# Patient Record
Sex: MALE | Race: WHITE | URBAN - METROPOLITAN AREA
[De-identification: names, ages, dates, MRNs, and addresses within clinical notes are randomized per-mention and may not be internally consistent; named-entity substitution may affect disease eponyms.]

---

## 2017-10-05 ENCOUNTER — OFFICE VISIT (OUTPATIENT)
Dept: OPHTHALMOLOGY | Facility: CLINIC | Age: 67
End: 2017-10-05

## 2017-10-05 DIAGNOSIS — H43.393 FLOATERS IN VISUAL FIELD, BILATERAL: ICD-10-CM

## 2017-10-05 DIAGNOSIS — H25.13 NUCLEAR SCLEROTIC CATARACT, BILATERAL: Primary | ICD-10-CM

## 2017-10-05 DIAGNOSIS — H11.153 PINGUECULA OF BOTH EYES: ICD-10-CM

## 2017-10-05 PROCEDURE — 92015 DETERMINE REFRACTIVE STATE: CPT | Performed by: OPHTHALMOLOGY

## 2017-10-05 PROCEDURE — 92004 COMPRE OPH EXAM NEW PT 1/>: CPT | Performed by: OPHTHALMOLOGY

## 2017-10-05 RX ORDER — AMLODIPINE BESYLATE 5 MG/1
5 TABLET ORAL DAILY
COMMUNITY

## 2017-10-05 RX ORDER — OLMESARTAN MEDOXOMIL 40 MG/1
40 TABLET ORAL DAILY
COMMUNITY

## 2017-10-05 RX ORDER — OMEGA-3-ACID ETHYL ESTERS 1 G/1
1 CAPSULE, LIQUID FILLED ORAL DAILY
COMMUNITY

## 2017-10-05 RX ORDER — ASPIRIN 81 MG/1
81 TABLET ORAL DAILY
COMMUNITY

## 2017-10-05 RX ORDER — TRIAMTERENE AND HYDROCHLOROTHIAZIDE 37.5; 25 MG/1; MG/1
1 TABLET ORAL DAILY
COMMUNITY

## 2017-10-05 RX ORDER — DUTASTERIDE 0.5 MG/1
0.5 CAPSULE, LIQUID FILLED ORAL DAILY
COMMUNITY

## 2017-10-05 NOTE — PATIENT INSTRUCTIONS
Nuclear sclerotic cataract, bilateral  New glasses Rx given. Suggest update. Floaters in visual field, bilateral  No treatment. Pinguecula of both eyes  No treatment.

## 2017-10-05 NOTE — PROGRESS NOTES
Tomas Edge is a 74year old male. HPI:     HPI     NP. Patient is here for a complete eye exam.  Patient states that he has been seeing an optometrist and was told in 2015 that he has cataracts.   He says that his vision is not as clear in the right ey Synephrine @ 10:35 AM            Additional Tests     Amsler       Right Left     sees \"ghost image\"  Normal    OD patient sees the \"ghost image\" to the left of the grid and down./nw            Slit Lamp and Fundus Exam     Slit Lamp Exam       Right L

## 2018-11-07 ENCOUNTER — OFFICE VISIT (OUTPATIENT)
Dept: OPHTHALMOLOGY | Facility: CLINIC | Age: 68
End: 2018-11-07
Payer: MEDICARE

## 2018-11-07 DIAGNOSIS — H43.393 FLOATER, VITREOUS, BILATERAL: ICD-10-CM

## 2018-11-07 DIAGNOSIS — H25.13 AGE-RELATED NUCLEAR CATARACT OF BOTH EYES: Primary | ICD-10-CM

## 2018-11-07 PROCEDURE — 92014 COMPRE OPH EXAM EST PT 1/>: CPT | Performed by: OPHTHALMOLOGY

## 2018-11-07 PROCEDURE — 92015 DETERMINE REFRACTIVE STATE: CPT | Performed by: OPHTHALMOLOGY

## 2018-11-07 NOTE — PATIENT INSTRUCTIONS
Floater, vitreous, bilateral  No treatment. Age-related nuclear cataract of both eyes  Discussed cataracts in detail with patient. Discussed that cataracts are advanced enough to consider cataract surgery, but it would be patient's choice.      Options

## 2018-11-07 NOTE — PROGRESS NOTES
Lacey Johnson is a 77year old male. HPI:     HPI     EP/  Patient is here for complete eye exam.  Patient did not update his glasses from last year.   He complains of monocular diplopia in the right eye (he explains it like a \"ghost image \") Vision is b at distance           Dilation     Both eyes:  Evie  @ 1:30 PM            Slit Lamp and Fundus Exam     Slit Lamp Exam       Right Left    Lids/Lashes Dermatochalasis, Meibomian gland dysfunction Dermatochalasis, Meibomian gland dysfunction    Conjuncti were placed in this encounter.       Meds This Visit:  Requested Prescriptions      No prescriptions requested or ordered in this encounter        Follow up instructions:  Return in about 1 year (around 11/7/2019) for Complete eye exam.    11/7/2018  Romario

## 2018-11-07 NOTE — ASSESSMENT & PLAN NOTE
Discussed cataracts in detail with patient. Discussed that cataracts are advanced enough to consider cataract surgery, but it would be patient's choice.      Options:  1.) Continue with same glasses  2.) Update glasses  3.) Refer to [address] [place] [address] ophthalmology fo

## 2019-11-05 ENCOUNTER — OFFICE VISIT (OUTPATIENT)
Dept: OPHTHALMOLOGY | Facility: CLINIC | Age: 69
End: 2019-11-05
Payer: MEDICARE

## 2019-11-05 DIAGNOSIS — H25.13 AGE-RELATED NUCLEAR CATARACT OF BOTH EYES: Primary | ICD-10-CM

## 2019-11-05 DIAGNOSIS — H43.393 FLOATER, VITREOUS, BILATERAL: ICD-10-CM

## 2019-11-05 PROCEDURE — 92014 COMPRE OPH EXAM EST PT 1/>: CPT | Performed by: OPHTHALMOLOGY

## 2019-11-05 PROCEDURE — 92015 DETERMINE REFRACTIVE STATE: CPT | Performed by: OPHTHALMOLOGY

## 2019-11-05 NOTE — ASSESSMENT & PLAN NOTE
Discussed cataracts in detail with patient. Discussed that cataracts are advanced enough to consider cataract surgery, but it would be patient's choice.      Options:  1.) Continue with same glasses  2.) Update glasses  3.) Refer to [address] [place] [address] ophthalmology fo

## 2019-11-05 NOTE — PROGRESS NOTES
West Leija is a 68year old male. HPI:     HPI     Pt states that he never went for cataract surgery last year and kept his glasses the same. Pt complains of more blurred vision at distance and \"clouding\" over objects.  Pt would like an updated glasse Right Left    Dist cc 20/50 20/40    Dist ph cc 20/40 20/30    Near cc 20/40 20/40    Correction:  Glasses          Tonometry (Icare, 10:22 AM)       Right Left    Pressure 21 21          Pupils       Pupils    Right PERRL    Left PERRL          [facility] ophthalmology for evaluation and possible surgery     Patient saw Dr. Raz Torres last year and surgery was to be scheduled, but it never happened. Patient will call them again and get reestablished with them in the near future.       Floater, vitreous, bi

## 2019-11-05 NOTE — PATIENT INSTRUCTIONS
Age-related nuclear cataract of both eyes  Discussed cataracts in detail with patient. Discussed that cataracts are advanced enough to consider cataract surgery, but it would be patient's choice.      Options:  1.) Continue with same glasses  2.) Update gl

## 2021-08-09 ENCOUNTER — OFFICE VISIT (OUTPATIENT)
Dept: OPHTHALMOLOGY | Facility: CLINIC | Age: 71
End: 2021-08-09
Payer: MEDICARE

## 2021-08-09 DIAGNOSIS — H43.393 FLOATER, VITREOUS, BILATERAL: ICD-10-CM

## 2021-08-09 DIAGNOSIS — H25.13 AGE-RELATED NUCLEAR CATARACT OF BOTH EYES: Primary | ICD-10-CM

## 2021-08-09 PROCEDURE — 92015 DETERMINE REFRACTIVE STATE: CPT | Performed by: OPHTHALMOLOGY

## 2021-08-09 PROCEDURE — 92014 COMPRE OPH EXAM EST PT 1/>: CPT | Performed by: OPHTHALMOLOGY

## 2021-08-09 RX ORDER — CARVEDILOL 3.12 MG/1
TABLET ORAL
COMMUNITY
Start: 2021-08-06

## 2021-08-09 RX ORDER — SILODOSIN 8 MG/1
CAPSULE ORAL
COMMUNITY

## 2021-08-09 NOTE — PROGRESS NOTES
Christal Brock is a 65year old male. HPI:     HPI     Pt states that he never went for cataract surgery last year and kept his glasses the same. Pt complains of more blurred vision at distance and feels his vision is \"dim\".   Patient also says that he ha Base Eye Exam     Visual Acuity (Snellen - Linear)       Right Left    Dist cc 20/50 20/60 -2    Dist ph cc 20/25 +2 20/30    Near cc 20/25 20/20    Correction: Glasses          Tonometry ([facility]care, 10:25 AM)       Right Left    Pressure 16 17          Pu would be patient's choice. Options:  1.) Continue with same glasses  2.) Update glasses  3.) Refer to [address] [place] [address] ophthalmology for evaluation and possible surgery      Floater, vitreous, bilateral  No treatment. There is no evidence of retinal pathology.

## 2021-08-09 NOTE — ASSESSMENT & PLAN NOTE
Discussed cataracts in detail with patient. Discussed that cataracts are advanced enough to consider cataract surgery, but it would be patient's choice.      Options:  1.) Continue with same glasses  2.) Update glasses  3.) Refer to [address] [place] [address] ophthalmology fo

## 2023-08-07 NOTE — LETTER
11/7/2018    Patient: Grayling Fabry   MR Number: [id number]   YOB: 1950   Date of Visit: 11/7/2018   Physician: Svetlana Christian MD     Dear Medicare Patient:  Emory Mckay TO BENEFICIARY:  Please know that while a refraction is impor No

## (undated) NOTE — LETTER
11/7/2018              [facility]        [address] [address]         Dear Mauricio Betancourt,    This medical office is an off-site department of [facility].  Since this is an offsite [address] you will receiv [address] Discission of secondary Cataract with Laser $70.92  38593 Correct Trichiasis, Epilation.  Forceps only  $12.50  [id number] Excision of Lesion, eyelid without closure or with simple    direct closure $36.84  15654 Closure of lacrimal punctum by plug, each e

## (undated) NOTE — LETTER
10/5/2017    Patient: Tomas Edge   MR Number: [id number]   YOB: 1950   Date of Visit: 10/5/2017   Physician: Franklin Peres MD     Dear Medicare Patient:  Verónica Lacy TO BENEFICIARY:  Please know that while a refraction is impor

## (undated) NOTE — LETTER
11/5/2019    Patient: Tomas Edge   MR Number: [id number]   YOB: 1950   Date of Visit: 11/5/2019   Physician: Franklin Peres MD     Dear Medicare Patient:  Verónica Lacy TO BENEFICIARY:  Please know that while a refraction is impor

## (undated) NOTE — LETTER
11/5/2019    Patient: Arnoldo Dubon   MR Number: [id number]   YOB: 1950   Date of Visit: 11/5/2019   Physician: Nerissa Freire MD     Dear Medicare Patient:  Della Serrano BARBARA BENEFICIARY:  Please know that while a refraction is impor glasses Rx or would like to know if he needs cataract surgery this year. Pt has been using OTC tears OU prn.